# Patient Record
Sex: FEMALE | Race: OTHER | HISPANIC OR LATINO | Employment: UNEMPLOYED | URBAN - METROPOLITAN AREA
[De-identification: names, ages, dates, MRNs, and addresses within clinical notes are randomized per-mention and may not be internally consistent; named-entity substitution may affect disease eponyms.]

---

## 2021-12-05 PROBLEM — M25.562 CHRONIC ARTHRALGIAS OF KNEES AND HIPS: Status: ACTIVE | Noted: 2021-12-05

## 2021-12-05 PROBLEM — M25.561 CHRONIC ARTHRALGIAS OF KNEES AND HIPS: Status: ACTIVE | Noted: 2021-12-05

## 2021-12-05 PROBLEM — M25.552 CHRONIC ARTHRALGIAS OF KNEES AND HIPS: Status: ACTIVE | Noted: 2021-12-05

## 2021-12-05 PROBLEM — G89.29 CHRONIC ARTHRALGIAS OF KNEES AND HIPS: Status: ACTIVE | Noted: 2021-12-05

## 2021-12-05 PROBLEM — M25.551 CHRONIC ARTHRALGIAS OF KNEES AND HIPS: Status: ACTIVE | Noted: 2021-12-05

## 2022-05-31 ENCOUNTER — OFFICE VISIT (OUTPATIENT)
Dept: GASTROENTEROLOGY | Facility: CLINIC | Age: 22
End: 2022-05-31
Payer: COMMERCIAL

## 2022-05-31 DIAGNOSIS — Z86.19 HISTORY OF HELICOBACTER PYLORI INFECTION: Primary | ICD-10-CM

## 2022-05-31 DIAGNOSIS — K59.00 CONSTIPATION, UNSPECIFIED CONSTIPATION TYPE: ICD-10-CM

## 2022-05-31 DIAGNOSIS — K21.9 GASTROESOPHAGEAL REFLUX DISEASE, UNSPECIFIED WHETHER ESOPHAGITIS PRESENT: ICD-10-CM

## 2022-05-31 PROCEDURE — 99203 PR OFFICE/OUTPT VISIT, NEW, LEVL III, 30-44 MIN: ICD-10-PCS | Mod: 95,,, | Performed by: PHYSICIAN ASSISTANT

## 2022-05-31 PROCEDURE — 99203 OFFICE O/P NEW LOW 30 MIN: CPT | Mod: 95,,, | Performed by: PHYSICIAN ASSISTANT

## 2022-05-31 RX ORDER — PANTOPRAZOLE SODIUM 40 MG/1
40 TABLET, DELAYED RELEASE ORAL DAILY
Qty: 30 TABLET | Refills: 11 | Status: SHIPPED | OUTPATIENT
Start: 2022-05-31 | End: 2022-06-10

## 2022-05-31 NOTE — PATIENT INSTRUCTIONS
-Repeat H. Pylori stool to ensure no active H. Pylori infection. If positive, recommend repeat EGD with HELIS testing. Instructed to stop Pepcid and not to start Protonix for 2 weeks prior to submitting stool sample.  -Begin Linzess for chronic constipation. Will start low dose - may need to be increased. Improving bowel function could improve some of her heartburn symptoms.   -Will follow up after stool resulted.

## 2022-05-31 NOTE — PROGRESS NOTES
Clinic Consult:  [facility] Consultation Note    Reason for Consult:  The primary encounter diagnosis was History of Helicobacter pylori infection. Diagnoses of Constipation, unspecified constipation type and Gastroesophageal reflux disease, unspecified whether esophagitis present were also pertinent to this visit.    PCP: Lianet Friedman       CC: No chief complaint on file.    The patient location is: work  The chief complaint leading to consultation is: GERD and recurrent H. pylori    Visit type: audiovisual    Face to Face time with patient: 15 mins  20 minutes of total time spent on the encounter, which includes face to face time and non-face to face time preparing to see the patient (eg, review of tests), Obtaining and/or reviewing separately obtained history, Documenting clinical information in the electronic or other health record, Independently interpreting results (not separately reported) and communicating results to the patient/family/caregiver, or Care coordination (not separately reported).     Each patient to whom he or she provides medical services by telemedicine is:  (1) informed of the relationship between the physician and patient and the respective role of any other health care provider with respect to management of the patient; and (2) notified that he or she may decline to receive medical services by telemedicine and may withdraw from such care at any time.    Notes:     HPI:  This is a 22 y.o. female reports today via telemedicine for evaluation of the above. She states her symptoms of reflux began around age 12yo. She has had numerous EGDs and has been diagnosed with H. Pylori gastritis 3x. Each time she was diagnosed, she had to take numerous rounds of antibiotics in order to eradicate. Most recent EGD was about 2 years ago in the  and required 3 rounds of abx. She continues with daily heartburn. She has been on PPI therapy in the past, but currently only takes Pepcid. This  is helpful, but does not resolve symptoms. She also reports chronic constipation. She reports that heartburn is worse when her constipation worsens. She is taking docusate sodium to help with her bowels. If she doesn't take anything, she will go about 4 days without a bowel movement.       Review of Systems   Constitutional: Positive for appetite change (occasional decrease). Negative for activity change, chills, diaphoresis, fatigue and fever.   HENT: Negative for trouble swallowing.    Respiratory: Negative for cough and shortness of breath.    Cardiovascular: Negative for chest pain.   Gastrointestinal: Positive for constipation. Negative for abdominal distention, abdominal pain, blood in stool, diarrhea, nausea and vomiting.   Neurological: Negative for dizziness, weakness and light-headedness.   Psychiatric/Behavioral: Negative for dysphoric mood. The patient is not nervous/anxious.         Medical History:   Past Medical History:   Diagnosis Date    Childhood asthma        Surgical History:   Past Surgical History:   Procedure Laterality Date    FRACTURE SURGERY      left elbow  2005    denies hardware       Family History:    Family History   Problem Relation Age of Onset    Hypothyroidism Mother     Osteopenia Mother     Hypertension Father     Alzheimer's disease Maternal Grandmother     Diabetes Paternal Grandmother     Hypertension Paternal Grandmother     Cancer Neg Hx     Heart attack Neg Hx     Stroke Neg Hx        Social History:   Social History     Socioeconomic History    Marital status: Single   Tobacco Use    Smoking status: Never Smoker    Smokeless tobacco: Never Used   Substance and Sexual Activity    Alcohol use: Never    Drug use: Never       Allergies:   Review of patient's allergies indicates:  No Known Allergies    Home Medications:   Current Outpatient Medications on File Prior to Visit   Medication Sig Dispense Refill    5-hydroxytryptophan, 5-HTP, 100 mg TbSR Take 200  mg by mouth once daily.      sertraline (ZOLOFT) 25 MG tablet Take 25 mg by mouth once daily.      triamcinolone acetonide 0.1% (KENALOG) 0.1 % ointment Apply topically 2 (two) times daily. 80 g 0     No current facility-administered medications on file prior to visit.       Physical Exam:  LMP  (LMP Unknown)   There is no height or weight on file to calculate BMI.  Physical Exam  Constitutional:       General: She is not in acute distress.     Appearance: Normal appearance. She is not ill-appearing, toxic-appearing or diaphoretic.   Neurological:      Mental Status: She is alert.           Labs: Pertinent labs reviewed.  Endoscopy: 2 years ago in the [place]. + H. pylori  CRC Screening: none  Anticoagulation: none    Assessment:  1. History of Helicobacter pylori infection    2. Constipation, unspecified constipation type    3. Gastroesophageal reflux disease, unspecified whether esophagitis present         Recommendations:  -Repeat H. Pylori stool to ensure no active H. Pylori infection. If positive, recommend repeat EGD with HELIS testing. Instructed to stop Pepcid and not to start Protonix for 2 weeks prior to submitting stool sample.  -Begin Linzess for chronic constipation. Will start low dose - may need to be increased. Improving bowel function could improve some of her heartburn symptoms.   -Will follow up after stool resulted.     History of Helicobacter pylori infection  -     H. pylori antigen, stool; Future; Expected date: 05/31/2022    Constipation, unspecified constipation type  -     linaCLOtide (LINZESS) 72 mcg Cap capsule; Take 1 capsule (72 mcg total) by mouth before breakfast.  Dispense: 30 capsule; Refill: 2    Gastroesophageal reflux disease, unspecified whether esophagitis present  -     pantoprazole (PROTONIX) 40 MG tablet; Take 1 tablet (40 mg total) by mouth once daily.  Dispense: 30 tablet; Refill: 11        No follow-ups on file.    Thank you so much for allowing me to participate in the care  of Adeline Kennedy PA-C

## 2022-06-03 ENCOUNTER — PATIENT MESSAGE (OUTPATIENT)
Dept: GASTROENTEROLOGY | Facility: CLINIC | Age: 22
End: 2022-06-03
Payer: COMMERCIAL

## 2022-06-03 DIAGNOSIS — K59.00 CONSTIPATION, UNSPECIFIED CONSTIPATION TYPE: Primary | ICD-10-CM

## 2022-06-03 RX ORDER — LACTULOSE 10 G/15ML
20 SOLUTION ORAL; RECTAL 2 TIMES DAILY
Qty: 1800 ML | Refills: 2 | Status: SHIPPED | OUTPATIENT
Start: 2022-06-03 | End: 2022-09-01

## 2022-06-17 ENCOUNTER — OFFICE VISIT (OUTPATIENT)
Dept: DERMATOLOGY | Facility: CLINIC | Age: 22
End: 2022-06-17
Payer: COMMERCIAL

## 2022-06-17 DIAGNOSIS — L70.9 ACNE, UNSPECIFIED ACNE TYPE: Primary | ICD-10-CM

## 2022-06-17 DIAGNOSIS — D48.9 NEOPLASM OF UNCERTAIN BEHAVIOR: ICD-10-CM

## 2022-06-17 PROCEDURE — 1160F PR REVIEW ALL MEDS BY PRESCRIBER/CLIN PHARMACIST DOCUMENTED: ICD-10-PCS | Mod: CPTII,95,, | Performed by: DERMATOLOGY

## 2022-06-17 PROCEDURE — 99203 OFFICE O/P NEW LOW 30 MIN: CPT | Mod: 95,,, | Performed by: DERMATOLOGY

## 2022-06-17 PROCEDURE — 1160F RVW MEDS BY RX/DR IN RCRD: CPT | Mod: CPTII,95,, | Performed by: DERMATOLOGY

## 2022-06-17 PROCEDURE — 1159F MED LIST DOCD IN RCRD: CPT | Mod: CPTII,95,, | Performed by: DERMATOLOGY

## 2022-06-17 PROCEDURE — 1159F PR MEDICATION LIST DOCUMENTED IN MEDICAL RECORD: ICD-10-PCS | Mod: CPTII,95,, | Performed by: DERMATOLOGY

## 2022-06-17 PROCEDURE — 99203 PR OFFICE/OUTPT VISIT, NEW, LEVL III, 30-44 MIN: ICD-10-PCS | Mod: 95,,, | Performed by: DERMATOLOGY

## 2022-06-17 NOTE — PATIENT INSTRUCTIONS
Daily sunscreen  In shower - salicylic acid oil free acne wash by [employer]rogen[employer]  Before bedtime - DIFFERIN GEL 0.1 one pea to entire face. Ok to use on chest and back, may cause irritation.

## 2022-06-17 NOTE — PROGRESS NOTES
The patient location is: home  The chief complaint leading to consultation is: rash and mole    Visit type: audiovisual    Face to Face time with patient: 20  minutes of total time spent on the encounter, which includes face to face time and non-face to face time preparing to see the patient (eg, review of tests), Obtaining and/or reviewing separately obtained history, Documenting clinical information in the electronic or other health record, Independently interpreting results (not separately reported) and communicating results to the patient/family/caregiver, or Care coordination (not separately reported).     Each patient to whom he or she provides medical services by telemedicine is:  (1) informed of the relationship between the physician and patient and the respective role of any other health care provider with respect to management of the patient; and (2) notified that he or she may decline to receive medical services by telemedicine and may withdraw from such care at any time.       Subjective:       Patient ID:  Adeline Johnson is a 22 y.o. female who presents for rash and mole.    HPI  C/o chronic acne and dark spots on face. Acne made worse by menses, scratching, and sunlight. Has tried OTC acne meds, cortisone, and lotions.  Acne gets worse with stress.  Discoloration is left after the acne goes away.  Mole on arm has changed shape and size.  Acne started 1 year ago on back, then chest and face. Was red, inflamed.+ stress definitely makes it worse.  Has used salicylic acid toner.  Using SPF 60 daily.    Review of Systems     Objective:    Physical Exam   Constitutional: She appears well-developed and well-nourished. No distress.   Neurological: She is alert and oriented to person, place, and time. She is not disoriented.   Psychiatric: She has a normal mood and affect.          Diagram Legend     Erythematous scaling macule/papule c/w actinic keratosis       Vascular papule c/w angioma       Pigmented verrucoid papule/plaque c/w seborrheic keratosis      Yellow umbilicated papule c/w sebaceous hyperplasia      Irregularly shaped tan macule c/w lentigo     1-2 mm smooth white papules consistent with Milia      Movable subcutaneous cyst with punctum c/w epidermal inclusion cyst      Subcutaneous movable cyst c/w pilar cyst      Firm pink to brown papule c/w dermatofibroma      Pedunculated fleshy papule(s) c/w skin tag(s)      Evenly pigmented macule c/w junctional nevus     Mildly variegated pigmented, slightly irregular-bordered macule c/w mildly atypical nevus      Flesh colored to evenly pigmented papule c/w intradermal nevus       Pink pearly papule/plaque c/w basal cell carcinoma      Erythematous hyperkeratotic cursted plaque c/w SCC      Surgical scar with no sign of skin cancer recurrence      Open and closed comedones      Inflammatory papules and pustules      Verrucoid papule consistent consistent with wart     Erythematous eczematous patches and plaques     Dystrophic onycholytic nail with subungual debris c/w onychomycosis     Umbilicated papule    Erythematous-base heme-crusted tan verrucoid plaque consistent with inflamed seborrheic keratosis     Erythematous Silvery Scaling Plaque c/w Psoriasis     See annotation                  Assessment / Plan:        Acne, unspecified acne type  Recommended OTC Differin 0.1 gel, one pea at nighttime, for anti aging and acne prevention.  Discussed that it may lead to dryness and irritation.  If tolerating fine after several months pt to contact for stronger retinoid RX  Encouraged continued daily SPF use for post inflammatory hyperpigmentation  OTC Neutrogena salicylic acid oil free wash to face, chest and back daily    Neoplasm of uncertain behavior  Arm - changed nevus, dark - advise in person dermatology evaluation.  Pt lives in [place], recommended Dr. Devin Kaiser.             Follow up if symptoms worsen or fail to improve.

## 2022-07-05 PROBLEM — E55.9 VITAMIN D DEFICIENCY: Status: ACTIVE | Noted: 2022-07-05

## 2022-08-07 PROBLEM — N64.52 NIPPLE DISCHARGE IN FEMALE: Status: ACTIVE | Noted: 2022-08-07

## 2022-08-07 PROBLEM — N64.4 BREAST PAIN: Status: ACTIVE | Noted: 2022-08-07
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
## 2022-09-23 PROBLEM — S01.81XA CHIN LACERATION: Status: ACTIVE | Noted: 2022-09-23

## 2022-09-27 PROBLEM — S06.9X1A CLOSED HEAD INJURY WITH BRIEF LOSS OF CONSCIOUSNESS: Status: ACTIVE | Noted: 2022-09-27

## 2022-09-27 PROBLEM — R68.84 PAIN IN LOWER JAW: Status: ACTIVE | Noted: 2022-09-27

## 2022-09-27 PROBLEM — R59.0 LYMPHADENOPATHY, INGUINAL: Status: ACTIVE | Noted: 2022-09-27

## 2022-09-27 PROBLEM — F32.5 MAJOR DEPRESSIVE DISORDER WITH SINGLE EPISODE, IN FULL REMISSION: Status: ACTIVE | Noted: 2022-09-27

## 2022-09-27 PROBLEM — M79.602 PAIN OF LEFT UPPER EXTREMITY: Status: ACTIVE | Noted: 2022-09-27

## 2022-09-27 PROBLEM — E66.3 OVERWEIGHT (BMI 25.0-29.9): Status: ACTIVE | Noted: 2022-09-27

## 2022-09-27 PROBLEM — E87.8 HYPERCHLOREMIA: Status: ACTIVE | Noted: 2022-09-27

## 2022-11-15 PROBLEM — M26.629 CHRONIC TMJ PAIN: Status: ACTIVE | Noted: 2022-11-15

## 2022-11-15 PROBLEM — M25.50 POLYARTHRALGIA: Status: ACTIVE | Noted: 2022-11-15

## 2022-11-15 PROBLEM — G89.29 CHRONIC TMJ PAIN: Status: ACTIVE | Noted: 2022-11-15

## 2022-11-15 PROBLEM — M54.50 ACUTE BILATERAL LOW BACK PAIN WITHOUT SCIATICA: Status: ACTIVE | Noted: 2022-11-15

## 2022-11-15 PROBLEM — M54.2 CERVICAL PAIN: Status: ACTIVE | Noted: 2022-11-15

## 2023-07-28 PROBLEM — F43.10 PTSD (POST-TRAUMATIC STRESS DISORDER): Status: ACTIVE | Noted: 2023-07-28

## 2023-07-28 PROBLEM — F33.2 MDD (MAJOR DEPRESSIVE DISORDER), RECURRENT SEVERE, WITHOUT PSYCHOSIS: Status: ACTIVE | Noted: 2022-09-27

## 2023-07-28 PROBLEM — F41.9 ANXIETY: Status: ACTIVE | Noted: 2023-07-28

## 2023-07-29 PROBLEM — F32.9: Status: ACTIVE | Noted: 2023-07-29

## 2023-07-29 PROBLEM — F31.9 BIPOLAR DISORDER: Status: ACTIVE | Noted: 2023-07-29

## 2023-07-29 PROBLEM — R45.851 SUICIDAL IDEATION: Status: ACTIVE | Noted: 2023-07-29

## 2023-07-31 PROBLEM — F33.2 MDD (MAJOR DEPRESSIVE DISORDER), RECURRENT SEVERE, WITHOUT PSYCHOSIS: Status: RESOLVED | Noted: 2022-09-27 | Resolved: 2023-07-31

## 2023-07-31 PROBLEM — F31.9 BIPOLAR DISORDER: Chronic | Status: ACTIVE | Noted: 2023-07-29

## 2023-08-01 PROBLEM — R45.851 SUICIDAL IDEATION: Status: RESOLVED | Noted: 2023-07-29 | Resolved: 2023-08-01

## 2023-08-01 PROBLEM — F41.9 ANXIETY: Status: RESOLVED | Noted: 2023-07-28 | Resolved: 2023-08-01

## 2023-08-02 PROBLEM — F32.9: Status: RESOLVED | Noted: 2023-07-29 | Resolved: 2023-08-02

## 2024-11-12 PROBLEM — G56.22 CUBITAL TUNNEL SYNDROME ON LEFT: Status: ACTIVE | Noted: 2024-11-12

## 2024-12-20 PROBLEM — F32.A DEPRESSIVE DISORDER: Status: ACTIVE | Noted: 2023-07-29

## 2025-02-19 PROBLEM — F90.2 ADHD (ATTENTION DEFICIT HYPERACTIVITY DISORDER), COMBINED TYPE: Status: ACTIVE | Noted: 2025-02-19

## 2025-03-28 PROBLEM — R29.818 SUSPECTED SLEEP APNEA: Status: ACTIVE | Noted: 2025-03-28

## 2025-03-28 PROBLEM — G47.10 HYPERSOMNIA: Status: ACTIVE | Noted: 2025-03-28

## 2025-06-02 PROBLEM — M77.02 MEDIAL EPICONDYLITIS, LEFT ELBOW: Status: ACTIVE | Noted: 2025-06-02

## 2025-06-02 PROBLEM — S66.812A: Status: ACTIVE | Noted: 2025-06-02

## 2025-06-16 ENCOUNTER — PATIENT MESSAGE (OUTPATIENT)
Dept: ADMINISTRATIVE | Facility: HOSPITAL | Age: 25
End: 2025-06-16
Payer: COMMERCIAL

## 2025-06-19 ENCOUNTER — PATIENT OUTREACH (OUTPATIENT)
Dept: ADMINISTRATIVE | Facility: HOSPITAL | Age: 25
End: 2025-06-19
Payer: COMMERCIAL

## 2025-07-03 PROBLEM — G47.419 NARCOLEPSY WITHOUT CATAPLEXY: Status: ACTIVE | Noted: 2025-07-03

## 2025-07-23 PROBLEM — F90.9 ATTENTION DEFICIT HYPERACTIVITY DISORDER (ADHD): Status: ACTIVE | Noted: 2025-02-19

## 2025-07-23 PROBLEM — F90.9 ATTENTION DEFICIT HYPERACTIVITY DISORDER (ADHD): Status: ACTIVE | Noted: 2025-07-23

## 2025-08-20 PROBLEM — F32.A DEPRESSIVE DISORDER: Status: RESOLVED | Noted: 2023-07-29 | Resolved: 2025-08-20

## 2025-08-22 ENCOUNTER — PATIENT OUTREACH (OUTPATIENT)
Dept: ADMINISTRATIVE | Facility: HOSPITAL | Age: 25
End: 2025-08-22
Payer: COMMERCIAL